# Patient Record
Sex: FEMALE | Race: WHITE | HISPANIC OR LATINO | URBAN - METROPOLITAN AREA
[De-identification: names, ages, dates, MRNs, and addresses within clinical notes are randomized per-mention and may not be internally consistent; named-entity substitution may affect disease eponyms.]

---

## 2023-09-06 ENCOUNTER — OFFICE VISIT (OUTPATIENT)
Dept: URBAN - METROPOLITAN AREA CLINIC 33 | Facility: CLINIC | Age: 68
End: 2023-09-06
Payer: MEDICARE

## 2023-09-06 ENCOUNTER — WEB ENCOUNTER (OUTPATIENT)
Dept: URBAN - METROPOLITAN AREA CLINIC 33 | Facility: CLINIC | Age: 68
End: 2023-09-06

## 2023-09-06 ENCOUNTER — TELEPHONE ENCOUNTER (OUTPATIENT)
Dept: URBAN - METROPOLITAN AREA CLINIC 35 | Facility: CLINIC | Age: 68
End: 2023-09-06

## 2023-09-06 VITALS
WEIGHT: 125 LBS | DIASTOLIC BLOOD PRESSURE: 78 MMHG | HEIGHT: 64 IN | BODY MASS INDEX: 21.34 KG/M2 | SYSTOLIC BLOOD PRESSURE: 118 MMHG

## 2023-09-06 DIAGNOSIS — K52.9 ACUTE AND CHRONIC COLITIS: ICD-10-CM

## 2023-09-06 DIAGNOSIS — R19.4 CHANGE IN BOWEL HABITS: ICD-10-CM

## 2023-09-06 DIAGNOSIS — K30 INDIGESTION: ICD-10-CM

## 2023-09-06 DIAGNOSIS — R68.81 EARLY SATIETY: ICD-10-CM

## 2023-09-06 PROBLEM — 88111009: Status: ACTIVE | Noted: 2023-09-06

## 2023-09-06 PROBLEM — 442076002: Status: ACTIVE | Noted: 2023-09-06

## 2023-09-06 PROBLEM — 35298007: Status: ACTIVE | Noted: 2023-09-06

## 2023-09-06 PROBLEM — 162031009: Status: ACTIVE | Noted: 2023-09-06

## 2023-09-06 PROCEDURE — 99204 OFFICE O/P NEW MOD 45 MIN: CPT | Performed by: PHYSICIAN ASSISTANT

## 2023-09-06 RX ORDER — FAMOTIDINE 20 MG/1
1 TABLET AT BEDTIME AS NEEDED TABLET, FILM COATED ORAL ONCE A DAY
Status: ACTIVE | COMMUNITY

## 2023-09-06 RX ORDER — HYDROCORTISONE 25 MG/G
1 APPLICATION TO AFFECTED AREA CREAM TOPICAL TWICE A DAY
Qty: 4 GRAM | Refills: 1 | Status: ON HOLD | COMMUNITY
Start: 2015-10-27

## 2023-09-06 RX ORDER — SODIUM, POTASSIUM,MAG SULFATES 17.5-3.13G
AS DIRECTED SOLUTION, RECONSTITUTED, ORAL ORAL
Qty: 1 | Refills: 0 | OUTPATIENT
Start: 2023-09-06 | End: 2023-09-08

## 2023-09-06 RX ORDER — SODIUM, POTASSIUM,MAG SULFATES 17.5-3.13G
AS DIRECTED SOLUTION, RECONSTITUTED, ORAL ORAL
Qty: 1 | Refills: 0
Start: 2023-09-06 | End: 2023-09-08

## 2023-09-06 NOTE — HPI-CHANGE IN BOWEL HABITS
67 year old female patient presents today for change in bowel habits. Patient admits recent onset of constipation. Onset was about 3 weeks ago. Patient admits she will go up to 4 days without a bowel movement and when she takes Metamucil and psyllium husk, and she will have a bowel movement every day/every other day, with strain. Stools are pebble-like. Denies any blood, mucus, or melena in stools. She denies any pruritus ani or rectal pain. When patient has a bowel movement she does not feel like she is completely emptying her bowels.  No diarrhea, rectal bleeding, significant weight loss, or lower abd pain.  2015 colonoscopy: active mild colitis in sigmoid colon, did not have f/u, possible UC, however pt does not recall this.

## 2023-09-06 NOTE — HPI-ACID REFLUX
Admits to mucus in her throat and reflux. Onset was about 8 years ago. Admits she takes Pepcid 20mg with relief of symptoms. Admits she also has regurgitation of food in her throat when she lays down at night. She has never had an EGD.  No dysphagia, heartburn.  She does endorse some early satiety.   She will have occasional epigastric discomfort that improves after eating or sometimes without eating.

## 2023-10-04 ENCOUNTER — CLAIMS CREATED FROM THE CLAIM WINDOW (OUTPATIENT)
Dept: URBAN - METROPOLITAN AREA CLINIC 4 | Facility: CLINIC | Age: 68
End: 2023-10-04
Payer: MEDICARE

## 2023-10-04 ENCOUNTER — OUT OF OFFICE VISIT (OUTPATIENT)
Dept: URBAN - METROPOLITAN AREA SURGERY CENTER 8 | Facility: SURGERY CENTER | Age: 68
End: 2023-10-04
Payer: MEDICARE

## 2023-10-04 DIAGNOSIS — K44.9 HERNIA, HIATAL: ICD-10-CM

## 2023-10-04 DIAGNOSIS — K57.30 DIVERTICULA OF COLON: ICD-10-CM

## 2023-10-04 DIAGNOSIS — K63.5 BENIGN COLON POLYP: ICD-10-CM

## 2023-10-04 DIAGNOSIS — D12.3 ADENOMA OF TRANSVERSE COLON: ICD-10-CM

## 2023-10-04 DIAGNOSIS — K63.89 APPENDICITIS EPIPLOICA: ICD-10-CM

## 2023-10-04 DIAGNOSIS — K31.A0 GASTRIC INTESTINAL METAPLASIA, UNSPECIFIED: ICD-10-CM

## 2023-10-04 DIAGNOSIS — R19.4 ALTERATION IN BOWEL ELIMINATION: ICD-10-CM

## 2023-10-04 DIAGNOSIS — K63.5 COLONIC POLYPS: ICD-10-CM

## 2023-10-04 DIAGNOSIS — R68.81 EARLY SATIETY: ICD-10-CM

## 2023-10-04 DIAGNOSIS — K31.89 MUCOSAL ABNORMALITY OF STOMACH: ICD-10-CM

## 2023-10-04 DIAGNOSIS — K29.60 ADENOPAPILLOMATOSIS GASTRICA: ICD-10-CM

## 2023-10-04 PROCEDURE — 88342 IMHCHEM/IMCYTCHM 1ST ANTB: CPT | Performed by: PATHOLOGY

## 2023-10-04 PROCEDURE — 45385 COLONOSCOPY W/LESION REMOVAL: CPT | Performed by: CLINIC/CENTER

## 2023-10-04 PROCEDURE — 00813 ANES UPR LWR GI NDSC PX: CPT | Performed by: NURSE ANESTHETIST, CERTIFIED REGISTERED

## 2023-10-04 PROCEDURE — 88305 TISSUE EXAM BY PATHOLOGIST: CPT | Performed by: PATHOLOGY

## 2023-10-04 PROCEDURE — 45380 COLONOSCOPY AND BIOPSY: CPT | Performed by: CLINIC/CENTER

## 2023-10-04 PROCEDURE — 45385 COLONOSCOPY W/LESION REMOVAL: CPT | Performed by: INTERNAL MEDICINE

## 2023-10-04 PROCEDURE — G8907 PT DOC NO EVENTS ON DISCHARG: HCPCS | Performed by: CLINIC/CENTER

## 2023-10-04 PROCEDURE — 43239 EGD BIOPSY SINGLE/MULTIPLE: CPT | Performed by: CLINIC/CENTER

## 2023-10-04 PROCEDURE — 45380 COLONOSCOPY AND BIOPSY: CPT | Performed by: INTERNAL MEDICINE

## 2023-10-04 PROCEDURE — 43239 EGD BIOPSY SINGLE/MULTIPLE: CPT | Performed by: INTERNAL MEDICINE

## 2023-10-25 ENCOUNTER — OFFICE VISIT (OUTPATIENT)
Dept: URBAN - METROPOLITAN AREA CLINIC 33 | Facility: CLINIC | Age: 68
End: 2023-10-25

## 2023-10-26 ENCOUNTER — DASHBOARD ENCOUNTERS (OUTPATIENT)
Age: 68
End: 2023-10-26

## 2023-10-26 ENCOUNTER — OFFICE VISIT (OUTPATIENT)
Dept: URBAN - METROPOLITAN AREA CLINIC 33 | Facility: CLINIC | Age: 68
End: 2023-10-26
Payer: MEDICARE

## 2023-10-26 VITALS
HEIGHT: 64 IN | WEIGHT: 126 LBS | HEART RATE: 76 BPM | OXYGEN SATURATION: 98 % | BODY MASS INDEX: 21.51 KG/M2 | DIASTOLIC BLOOD PRESSURE: 74 MMHG | SYSTOLIC BLOOD PRESSURE: 114 MMHG

## 2023-10-26 DIAGNOSIS — D12.3 BENIGN NEOPLASM OF TRANSVERSE COLON: ICD-10-CM

## 2023-10-26 DIAGNOSIS — K21.9 CHRONIC GERD: ICD-10-CM

## 2023-10-26 DIAGNOSIS — R19.4 CHANGE IN BOWEL HABITS: ICD-10-CM

## 2023-10-26 PROCEDURE — 99214 OFFICE O/P EST MOD 30 MIN: CPT | Performed by: INTERNAL MEDICINE

## 2023-10-26 RX ORDER — FAMOTIDINE 20 MG/1
1 TABLET AT BEDTIME AS NEEDED TABLET, FILM COATED ORAL ONCE A DAY
Status: ACTIVE | COMMUNITY

## 2023-10-26 RX ORDER — FAMOTIDINE 20 MG/1
1 TABLET AT BEDTIME AS NEEDED TABLET, FILM COATED ORAL ONCE A DAY
OUTPATIENT

## 2023-10-26 RX ORDER — HYDROCORTISONE 25 MG/G
1 APPLICATION TO AFFECTED AREA CREAM TOPICAL TWICE A DAY
Qty: 4 GRAM | Refills: 1 | Status: ON HOLD | COMMUNITY
Start: 2015-10-27

## 2023-10-26 NOTE — HPI-ENDOSCOPY (EGD) FOLLOWUP
Patient presents today for a follow-up from the EGD that was done on 10/04/2023. Since having the procedure, patient denies odynophagia, dysphagia, orglobus sensation. Patient denies any complications following the procedure.

## 2023-10-26 NOTE — HPI-ACID REFLUX
Patient admits the continuation of Pepcid 20 mg, daily. She admits improvement in the regurgitation. Patient states that if she does not take Pepcid, she will experience regurgitation. She will also have symptoms if she eats late at night. Patient admits improvement in the associated symptoms of epigastric pain. She admits being able to complete her meals. Patient denies nausea or vomiting.   Last visit (09/06/2023) Admits to mucus in her throat and reflux. Onset was about 8 years ago. Admits she takes Pepcid 20mg with relief of symptoms. Admits she also has regurgitation of food in her throat when she lays down at night. She has never had an EGD.  No dysphagia, heartburn.  She does endorse some early satiety.   She will have occasional epigastric discomfort that improves after eating or sometimes without eating.

## 2023-10-26 NOTE — HPI-CHANGE IN BOWEL HABITS
Patient denies continuing Benefiber. She states that the will take Benefiber only if she is constipated. She denies improvement in her bowel habits and states "I am still passing pebble-like pieces of stool, instead of a normally formed stool. Patient is currently having 1 bowel movement per day, without strain. Her stools are "mostly ada" without the presence of blood, mucus, or melena. She admits any pruritus ani or rectal pain. When patient has a bowel movement, she sometimes feels like she is completely emptying out her bowels, sometimes she does not.  Last visit (09/06/2023) 67 year old female patient presents today for change in bowel habits. Patient admits recent onset of constipation. Onset was about 3 weeks ago. Patient admits she will go up to 4 days without a bowel movement and when she takes Metamucil and psyllium husk, and she will have a bowel movement every day/every other day, with strain. Stools are pebble-like. Denies any blood, mucus, or melena in stools. She denies any pruritus ani or rectal pain. When patient has a bowel movement she does not feel like she is completely emptying her bowels.  No diarrhea, rectal bleeding, significant weight loss, or lower abd pain.  2015 colonoscopy: active mild colitis in sigmoid colon, did not have f/u, possible UC, however pt does not recall this.

## 2023-10-26 NOTE — HPI-COLONOSCOPY FOLLOWUP
Patient presents today for a follow-up from the colonoscopy that was done on 10/04/2023. Since having the procedure, patient is having 1 bowel movement per day. Stools are "mostly ada" without strain. Patient denies blood or mucous in her stool. She denies melena. Patient denies any complications following the procedure.

## 2023-10-31 LAB
A/G RATIO: 1.6
ALBUMIN: 4.3
ALKALINE PHOSPHATASE: 93
ALT (SGPT): 12
AST (SGOT): 18
BILIRUBIN, TOTAL: 0.5
BUN/CREATININE RATIO: (no result)
BUN: 12
CALCIUM: 9.8
CARBON DIOXIDE, TOTAL: 24
CHLORIDE: 98
CREATININE: 1.04
EGFR: 58
GLOBULIN, TOTAL: 2.7
GLUCOSE: 81
POTASSIUM: 4.6
PROTEIN, TOTAL: 7
SODIUM: 133
TSH: 0.76

## 2023-11-09 PROBLEM — 92448001: Status: ACTIVE | Noted: 2023-11-09

## 2023-11-09 PROBLEM — 235595009: Status: ACTIVE | Noted: 2023-11-09
